# Patient Record
Sex: MALE | Race: WHITE
[De-identification: names, ages, dates, MRNs, and addresses within clinical notes are randomized per-mention and may not be internally consistent; named-entity substitution may affect disease eponyms.]

---

## 2017-08-23 ENCOUNTER — HOSPITAL ENCOUNTER (EMERGENCY)
Dept: HOSPITAL 31 - C.ER | Age: 72
Discharge: HOME | End: 2017-08-23
Payer: MEDICAID

## 2017-08-23 VITALS
TEMPERATURE: 97.6 F | RESPIRATION RATE: 20 BRPM | HEART RATE: 95 BPM | DIASTOLIC BLOOD PRESSURE: 90 MMHG | SYSTOLIC BLOOD PRESSURE: 129 MMHG | OXYGEN SATURATION: 98 %

## 2017-08-23 DIAGNOSIS — H60.91: Primary | ICD-10-CM

## 2017-08-23 NOTE — C.PDOC
History Of Present Illness


71 y/o M p/w R ear pain, itchiness since last night. Denies fever, stiff neck, 

vomiting, confusion.


Time Seen by Provider: 08/23/17 09:35


Chief Complaint (Nursing): ENT Problem





Past Medical History


Vital Signs: 





 Last Vital Signs











Temp  97.6 F   08/23/17 09:25


 


Pulse  95 H  08/23/17 09:25


 


Resp  20   08/23/17 09:25


 


BP  129/90   08/23/17 09:25


 


Pulse Ox  98   08/23/17 09:25














- Medical History


PMH: Atrial Fibrillation, Hypothyroidism


Family History: States: No Known Family Hx





- Social History


Hx Tobacco Use: No


Hx Alcohol Use: No


Hx Substance Use: No





- Immunization History


Hx Tetanus Toxoid Vaccination: No


Hx Influenza Vaccination: No


Hx Pneumococcal Vaccination: No





Review Of Systems


Except As Marked, All Systems Reviewed And Found Negative.


Constitutional: Negative for: Fever


Cardiovascular: Negative for: Chest Pain


Respiratory: Negative for: Shortness of Breath





Physical Exam





- Physical Exam


Appears: Non-toxic, No Acute Distress


Skin: Normal Color


Head: Normacephalic


Ear(s): Right: Other (canal swollen)


Oral Mucosa: Moist


Respiratory: No Accessory Muscle Use


Gait: Steady





ED Course And Treatment


O2 Sat by Pulse Oximetry: 98





Medical Decision Making


Medical Decision Making: 


Return to ER for worsening pain, stiff neck, fever, vomiting, or any other 

problem.





Disposition





- Disposition


Referrals: 


Behin,Babak, MD [Staff Provider] - 


Disposition: HOME/ ROUTINE


Disposition Time: 09:56


Condition: STABLE


Prescriptions: 


Ciprofloxacin/Hydrocortisone [Cipro Hc Otic Suspension] 1 drop AD BID #10 ml


Instructions:  Otitis Externa (ED)


Forms:  [facility] (English)





- Clinical Impression


Clinical Impression: 


 Otitis externa

## 2019-03-27 ENCOUNTER — HOSPITAL ENCOUNTER (EMERGENCY)
Dept: HOSPITAL 31 - C.ER | Age: 74
Discharge: HOME | End: 2019-03-27
Payer: MEDICAID

## 2019-03-27 VITALS
TEMPERATURE: 97.5 F | SYSTOLIC BLOOD PRESSURE: 125 MMHG | RESPIRATION RATE: 20 BRPM | OXYGEN SATURATION: 95 % | DIASTOLIC BLOOD PRESSURE: 74 MMHG | HEART RATE: 99 BPM

## 2019-03-27 DIAGNOSIS — B35.1: Primary | ICD-10-CM

## 2019-03-27 NOTE — C.PDOC
History Of Present Illness


Patient is a 74 year old male with a past medical history of afib who presents 

with complaints of nail fungus on his right middle finger. He has had it for 1 

year and has been applying a lacquer TID for one month to his nail. He does not 

know the name of the lacquer. He has no additional complaints, denies trauma, 

and other digits affected by the fungus. He is requesting additional treatment. 





PMD: Dr. Manjarrez, however, his mcaid just ended


PMHx: Afib


Medications: Eliquis 5mg PO BID, Propanolol 10mg PO BID


Allergies: NKDA


SurgHx: surgery on his LLE veins ?


SocHx: denies tobacco, alcohol, and drug use.





<Leydi Bobby - Last Filed: 03/27/19 13:19>





<Adeline Beltran - Last Filed: 03/27/19 11:48>





<Leydi Bobby - Last Filed: 03/27/19 13:19>


Time Seen by Provider: 03/27/19 11:28


Chief Complaint (Nursing): Finger,Hand,&Wrist





Past Medical History


Vital Signs: 





                                Last Vital Signs











Temp  97.5 F L  03/27/19 11:16


 


Pulse  99 H  03/27/19 11:16


 


Resp  20   03/27/19 11:16


 


BP  125/74   03/27/19 11:16


 


Pulse Ox  95   03/27/19 11:41














<Adeline Beltran - Last Filed: 03/27/19 11:48>


Vital Signs: 





                                Last Vital Signs











Temp  97.5 F L  03/27/19 11:16


 


Pulse  99 H  03/27/19 11:16


 


Resp  20   03/27/19 11:16


 


BP  125/74   03/27/19 11:16


 


Pulse Ox  95   03/27/19 11:16














- Medical History


PMH: Atrial Fibrillation, Hypothyroidism


Family History: States: Unknown Family Hx





- Social History


Hx Tobacco Use: No


Hx Alcohol Use: No


Hx Substance Use: No





- Immunization History


Hx Tetanus Toxoid Vaccination: No


Hx Influenza Vaccination: No


Hx Pneumococcal Vaccination: No





<Leydi Bobby - Last Filed: 03/27/19 13:19>





Review Of Systems


Constitutional: Negative for: Fever, Chills


Skin: Positive for: Other (right middle finger nail fungus)





<Leydi Bobby - Last Filed: 03/27/19 13:19>





Physical Exam





- Physical Exam


Appears: Well, Non-toxic, No Acute Distress


Skin: Warm, Other (right middle finger nail fungus; dry cracked skin on multiple

digits)





<Leydi Bobby - Last Filed: 03/27/19 13:19>





ED Course And Treatment


O2 Sat by Pulse Oximetry: 95





<Leydi Bobby - Last Filed: 03/27/19 13:19>





Medical Decision Making


Medical Decision Making: 


Patient provided prescription for PenLac Lacquer to apply daily to fingernails. 

Patient instructed to follow up in [facility] in [facility]

for continued management and for additional treatment if current treatment 

fails. 





<Leydi Bobby - Last Filed: 03/27/19 13:19>





Disposition





<Adeline Beltran - Last Filed: 03/27/19 11:48>





- Disposition


Disposition Time: 11:37





<Leydi Bobby - Last Filed: 03/27/19 13:19>





- Disposition


Referrals: 


[facility] [Outside]


Disposition: HOME/ ROUTINE


Condition: STABLE


Additional Instructions: 


Patient provided prescription for PenLac Lacquer to apply daily to fingernails. 

Please follow up in [facility] in [facility] for continued 

management. 


Prescriptions: 


Ciclopirox [Penlac] 6.6 ml TP DAILY #1 bottle


Instructions:  Fungal Nail Infections


Forms:  [facility] (English)





- Clinical Impression


Clinical Impression: 


 Onychomycosis